# Patient Record
Sex: FEMALE | Race: BLACK OR AFRICAN AMERICAN | NOT HISPANIC OR LATINO | URBAN - METROPOLITAN AREA
[De-identification: names, ages, dates, MRNs, and addresses within clinical notes are randomized per-mention and may not be internally consistent; named-entity substitution may affect disease eponyms.]

---

## 2021-10-24 ENCOUNTER — EMERGENCY (EMERGENCY)
Facility: HOSPITAL | Age: 26
LOS: 1 days | Discharge: HOME | End: 2021-10-24
Attending: EMERGENCY MEDICINE | Admitting: EMERGENCY MEDICINE
Payer: COMMERCIAL

## 2021-10-24 VITALS
SYSTOLIC BLOOD PRESSURE: 147 MMHG | RESPIRATION RATE: 18 BRPM | DIASTOLIC BLOOD PRESSURE: 80 MMHG | TEMPERATURE: 98 F | HEIGHT: 64 IN | HEART RATE: 96 BPM | WEIGHT: 199.96 LBS | OXYGEN SATURATION: 100 %

## 2021-10-24 DIAGNOSIS — O26.891 OTHER SPECIFIED PREGNANCY RELATED CONDITIONS, FIRST TRIMESTER: ICD-10-CM

## 2021-10-24 DIAGNOSIS — R10.30 LOWER ABDOMINAL PAIN, UNSPECIFIED: ICD-10-CM

## 2021-10-24 DIAGNOSIS — Z3A.12 12 WEEKS GESTATION OF PREGNANCY: ICD-10-CM

## 2021-10-24 DIAGNOSIS — Z87.42 PERSONAL HISTORY OF OTHER DISEASES OF THE FEMALE GENITAL TRACT: ICD-10-CM

## 2021-10-24 LAB
APPEARANCE UR: CLEAR — SIGNIFICANT CHANGE UP
BILIRUB UR-MCNC: NEGATIVE — SIGNIFICANT CHANGE UP
COLOR SPEC: YELLOW — SIGNIFICANT CHANGE UP
DIFF PNL FLD: NEGATIVE — SIGNIFICANT CHANGE UP
GLUCOSE UR QL: NEGATIVE — SIGNIFICANT CHANGE UP
KETONES UR-MCNC: 15 MG/DL
LEUKOCYTE ESTERASE UR-ACNC: NEGATIVE — SIGNIFICANT CHANGE UP
NITRITE UR-MCNC: NEGATIVE — SIGNIFICANT CHANGE UP
PH UR: 6.5 — SIGNIFICANT CHANGE UP (ref 5–8)
PROT UR-MCNC: ABNORMAL MG/DL
SP GR SPEC: 1.02 — SIGNIFICANT CHANGE UP (ref 1–1.03)
UROBILINOGEN FLD QL: 0.2 E.U./DL — SIGNIFICANT CHANGE UP

## 2021-10-24 PROCEDURE — 87086 URINE CULTURE/COLONY COUNT: CPT

## 2021-10-24 PROCEDURE — 99285 EMERGENCY DEPT VISIT HI MDM: CPT

## 2021-10-24 PROCEDURE — 81001 URINALYSIS AUTO W/SCOPE: CPT

## 2021-10-24 PROCEDURE — 76815 OB US LIMITED FETUS(S): CPT | Mod: 26

## 2021-10-24 PROCEDURE — 76815 OB US LIMITED FETUS(S): CPT

## 2021-10-24 PROCEDURE — 99284 EMERGENCY DEPT VISIT MOD MDM: CPT | Mod: 25

## 2021-10-24 NOTE — ED ADULT NURSE NOTE - NSIMPLEMENTINTERV_GEN_ALL_ED
Implemented All Universal Safety Interventions:  Magazine to call system. Call bell, personal items and telephone within reach. Instruct patient to call for assistance. Room bathroom lighting operational. Non-slip footwear when patient is off stretcher. Physically safe environment: no spills, clutter or unnecessary equipment. Stretcher in lowest position, wheels locked, appropriate side rails in place.

## 2021-10-24 NOTE — ED PROVIDER NOTE - PHYSICAL EXAMINATION
GEN: Well appearing, well developed, awake, alert, oriented to person, place, time/situation and in no apparent distress. NTAF  ENT: Airway patent, Nasal mucosa clear. Mouth with normal mucosa.  EYES: Clear bilaterally. PERRL, EOMI  RESPIRATORY: Breathing comfortably with normal RR. No W/C/R, no hypoxia or resp distress.  CARDIAC: Regular rate and rhythm, no M/R/G  ABDOMEN: Soft, nontender, +bowel sounds, no rebound, rigidity, or guarding.  : Pt deferred to her upcoming gyn appt on Nov 3.  MSK: Range of motion is not limited, no deformities noted.  NEURO: Alert and oriented, no focal deficits.  SKIN: Skin normal color for race, warm, dry and intact. No evidence of rash.  PSYCH: Alert and oriented to person, place, time/situation. normal mood and affect. no apparent risk to self or others.

## 2021-10-24 NOTE — ED PROVIDER NOTE - CLINICAL SUMMARY MEDICAL DECISION MAKING FREE TEXT BOX
26F  approx 12 week preg (pt cannot remember LMP, thinks it was ~4 months ago), follows with Ledy Randi, reports normal US at last appointment, known uterine fibroid, who p/w LAP and cramping off and on x 3 days since Thursday. No vaginal bleeding or DC, no f/c, no urinary complaints. She has not taken anything for pain. No trauma or fall, no other complaints. Has f/u OB appt on Nov 3.    Pt is well-appearing on exam, VSS, abd soft, nontender, nonsurgical. POCUS shows known uterine fibroid and +IUP with , no FF.   Pain likely due to growing uterus and known fibroid. UA/UCx sent to r/o UTI. Pt advised to eat healthy diet, stay hydrated, tylenol PRN, f/u with OB on Nov 3.     Pt feeling improved and is stable for DC. ED evaluation and management discussed with the patient in detail.  Close PMD follow up encouraged.  Strict ED return instructions discussed in detail and patient given the opportunity to ask any questions about their discharge diagnosis and instructions. Patient verbalized understanding.

## 2021-10-24 NOTE — ED ADULT NURSE NOTE - OBJECTIVE STATEMENT
26y female . reports she is 16wks pregnant c/o lower abdominal pain and back pain since Thursday. hx of fibroids. unknown LMP. states, "I have pain near my belly button and it goes to my back." denies urinary sx, vaginal bleeding, CP, SOB, n/v/d, headache, dizziness, fever/chills No acute distress noted at this time. normal BMs.

## 2021-10-24 NOTE — ED PROVIDER NOTE - OBJECTIVE STATEMENT
26F  approx 12 week preg (pt cannot remember LMP, thinks it was ~4 months ago), follows with Ledy Bryan, reports normal US at last appointment, known uterine fibroid, who p/w LAP and cramping off and on x 3 days since Thursday. No vaginal bleeding or DC, no f/c, no urinary complaints. She has not taken anything for pain. No trauma or fall, no other complaints. Has f/u OB appt on Nov 3.

## 2021-10-24 NOTE — ED PROVIDER NOTE - NSFOLLOWUPINSTRUCTIONS_ED_ALL_ED_FT
Please eat a healthy diet, stay hydrated, take tylenol as needed, follow up with your OB on November 3.      Abdominal Pain in Pregnancy    WHAT YOU NEED TO KNOW:    Abdominal pain during pregnancy is common. Some of the causes include heartburn, constipation, gas, false labor, and round ligament pain. Round ligament pain is caused by stretching of the ligaments that support your uterus. Abdominal pain may be caused by a health problem, such as a stomach virus or appendicitis (inflammation of the appendix). The pain may also be caused by a problem with your pregnancy, such as a threatened miscarriage or  labor.    DISCHARGE INSTRUCTIONS:    Call your local emergency number (911 in the ) if:   •You have a fast heartbeat.      •You have shortness of breath.      •You feel lightheaded or faint.      Return to the emergency department if:   •You have sudden, severe pain or cramps that are so bad that you cannot walk or talk.      •You have vaginal bleeding or discharge.      •You have nausea, vomiting, fever, and severe pain on your right side.      Call your obstetrician if:   •You have light vaginal bleeding or spotting.      •You continue to have abdominal pain that cannot be relieved.      •You have a fever.      •You have questions or concerns about your condition or care.      Medicines: Ask your healthcare provider before you take any medicine during pregnancy, including over-the-counter pain medicines.  •Acetaminophen may be recommended. Ask how much to take and how often to take it. Follow directions. Acetaminophen can cause liver damage if not taken correctly. Do not use more than 4 grams (4,000 milligrams) total of acetaminophen in 1 day. Acetaminophen can cause liver damage. Your provider will tell you how much is safe to take each day during pregnancy. Too much medicine can be harmful to your baby. Read the labels of all other medicines you are using to see if they also contain acetaminophen, or ask your doctor or pharmacist.      •Take your medicine as directed. Contact your healthcare provider if you think your medicine is not helping or if you have side effects. Tell him or her if you are allergic to any medicine. Keep a list of the medicines, vitamins, and herbs you take. Include the amounts, and when and why you take them. Bring the list or the pill bottles to follow-up visits. Carry your medicine list with you in case of an emergency.      Self-care:   •Rest as needed. Rest may help to relieve pain. Your healthcare provider may recommend that you rest on your side instead of on your back. He or she may tell you to lie on your left side, if possible. Place a pillow under your abdomen. Keep another pillow between your knees. Ask your provider about other ways to relieve this pain, such as a supportive belt or pregnancy exercises.      •Do not lie flat in bed or bend over if you have heartburn. Ask your obstetrician if you should make any changes to the foods you eat. Ask if you can take any medicines for heartburn.  Prevent GERD           •Move slowly. Avoid quick changes in position or movements that cause pain.      •Exercise as directed. Gentle exercise can keep the ligaments loose and strengthen core (abdominal) muscles. An example is swimming, or a yoga program designed for pregnancy. Ask your healthcare provider which exercises are safe for you and how often to exercise. For most healthy women, a good goal is to try to get at least 30 minutes of exercise every day. If activity causes pain, try not to walk too long or too far at one time. Break your exercise up into short amounts.  Walking During Pregnancy           •Apply a warm compress to the area. Warmth can relieve pain and muscle spasms. Ask your healthcare provider if you can take a warm bath or use a heating pad. Keep all heat settings low. High heat can be dangerous for your baby. Do not sit in a hot tub or use hot water in your bath. You may also be able to massage the area gently while you are applying heat. Massage can help relieve pain.      •Eat more fiber and drink more liquids to relieve constipation. Fiber is found in fruits, vegetables, and whole-grain foods, such as whole-wheat bread and cereals. Ask how much liquid to drink each day and which liquids are best for you.    Follow up with your obstetrician within 3 days or as directed: Write down your questions so you remember to ask them during your visits.    Uterine Fibroids  Uterine fibroids are tissue masses (tumors). They are also called leiomyomas. They can develop inside of a woman’s womb (uterus). They can grow very large. Fibroids are not cancerous (benign). Most fibroids do not require medical treatment.    Follow these instructions at home:  Keep all follow-up visits as told by your doctor. This is important.  Take medicines only as told by your doctor.    If you were prescribed a hormone treatment, take the hormone medicines exactly as told.  Do not take aspirin. It can cause bleeding.    Ask your doctor about taking iron pills and increasing the amount of dark green, leafy vegetables in your diet. These actions can help to boost your blood iron levels.  Pay close attention to your period. Tell your doctor about any changes, such as:    Increased blood flow. This may require you to use more pads or tampons than usual per month.  A change in the number of days that your period lasts per month.  A change in symptoms that come with your period, such as back pain or cramping in your belly area (abdomen).    Contact a doctor if:  You have pain in your back or the area between your hip bones (pelvic area) that is not controlled by medicines.  You have pain in your abdomen that is not controlled with medicines.  You have an increase in bleeding between and during periods.  You soak tampons or pads in a half hour or less.  You feel lightheaded.  You feel extra tired.  You feel weak.  Get help right away if:  You pass out (faint).  You have a sudden increase in pelvic pain.  This information is not intended to replace advice given to you by your health care provider. Make sure you discuss any questions you have with your health care provider.

## 2021-10-24 NOTE — ED ADULT TRIAGE NOTE - CHIEF COMPLAINT QUOTE
Pt approximately 12 weeks pregnant, , c/o lower abdominal pain since Thursday. Pt denies vaginal bleeding. Pt talking in clear full sentences, respirations even and unlabored.

## 2021-10-24 NOTE — ED PROVIDER NOTE - PATIENT PORTAL LINK FT
You can access the FollowMyHealth Patient Portal offered by [facility] by registering at the following website: [email or web address]. By joining [employer]’s FollowMyHealth portal, you will also be able to view your health information using other applications (apps) compatible with our system.

## 2021-10-26 LAB
CULTURE RESULTS: NO GROWTH — SIGNIFICANT CHANGE UP
SPECIMEN SOURCE: SIGNIFICANT CHANGE UP

## 2022-12-12 NOTE — ED ADULT NURSE NOTE - CHIEF COMPLAINT QUOTE
Pt approximately 12 weeks pregnant, , c/o lower abdominal pain since Thursday. Pt denies vaginal bleeding. Pt talking in clear full sentences, respirations even and unlabored.
no